# Patient Record
Sex: FEMALE | Race: WHITE | NOT HISPANIC OR LATINO | Employment: STUDENT | URBAN - METROPOLITAN AREA
[De-identification: names, ages, dates, MRNs, and addresses within clinical notes are randomized per-mention and may not be internally consistent; named-entity substitution may affect disease eponyms.]

---

## 2020-12-14 ENCOUNTER — OCCMED (OUTPATIENT)
Dept: URGENT CARE | Facility: CLINIC | Age: 20
End: 2020-12-14

## 2020-12-14 DIAGNOSIS — Z02.1 PRE-EMPLOYMENT HEALTH SCREENING EXAMINATION: Primary | ICD-10-CM

## 2020-12-14 PROCEDURE — 86480 TB TEST CELL IMMUN MEASURE: CPT

## 2020-12-16 LAB
GAMMA INTERFERON BACKGROUND BLD IA-ACNC: 0.02 IU/ML
M TB IFN-G BLD-IMP: NEGATIVE
M TB IFN-G CD4+ BCKGRND COR BLD-ACNC: 0 IU/ML
M TB IFN-G CD4+ BCKGRND COR BLD-ACNC: 0.01 IU/ML
MITOGEN IGNF BCKGRD COR BLD-ACNC: >10 IU/ML

## 2021-01-13 ENCOUNTER — IMMUNIZATIONS (OUTPATIENT)
Dept: FAMILY MEDICINE CLINIC | Facility: HOSPITAL | Age: 21
End: 2021-01-13

## 2021-01-13 DIAGNOSIS — Z23 ENCOUNTER FOR IMMUNIZATION: ICD-10-CM

## 2021-01-13 PROCEDURE — 91301 SARS-COV-2 / COVID-19 MRNA VACCINE (MODERNA) 100 MCG: CPT

## 2021-01-13 PROCEDURE — 0011A SARS-COV-2 / COVID-19 MRNA VACCINE (MODERNA) 100 MCG: CPT

## 2021-02-12 ENCOUNTER — IMMUNIZATIONS (OUTPATIENT)
Dept: FAMILY MEDICINE CLINIC | Facility: HOSPITAL | Age: 21
End: 2021-02-12

## 2021-02-12 DIAGNOSIS — Z23 ENCOUNTER FOR IMMUNIZATION: Primary | ICD-10-CM

## 2021-02-12 PROCEDURE — 0012A SARS-COV-2 / COVID-19 MRNA VACCINE (MODERNA) 100 MCG: CPT

## 2021-02-12 PROCEDURE — 91301 SARS-COV-2 / COVID-19 MRNA VACCINE (MODERNA) 100 MCG: CPT

## 2021-04-01 ENCOUNTER — OCCMED (OUTPATIENT)
Dept: URGENT CARE | Facility: CLINIC | Age: 21
End: 2021-04-01

## 2021-04-01 DIAGNOSIS — Z02.1 PRE-EMPLOYMENT HEALTH SCREENING EXAMINATION: Primary | ICD-10-CM

## 2021-04-01 PROCEDURE — 86480 TB TEST CELL IMMUN MEASURE: CPT | Performed by: PHYSICIAN ASSISTANT

## 2021-04-05 LAB
GAMMA INTERFERON BACKGROUND BLD IA-ACNC: 0.04 IU/ML
M TB IFN-G BLD-IMP: NEGATIVE
M TB IFN-G CD4+ BCKGRND COR BLD-ACNC: -0.01 IU/ML
M TB IFN-G CD4+ BCKGRND COR BLD-ACNC: 0 IU/ML
MITOGEN IGNF BCKGRD COR BLD-ACNC: >10 IU/ML

## 2023-08-21 ENCOUNTER — OFFICE VISIT (OUTPATIENT)
Dept: RHEUMATOLOGY | Facility: CLINIC | Age: 23
End: 2023-08-21
Payer: COMMERCIAL

## 2023-08-21 VITALS
BODY MASS INDEX: 29.07 KG/M2 | SYSTOLIC BLOOD PRESSURE: 98 MMHG | HEIGHT: 61 IN | HEART RATE: 87 BPM | DIASTOLIC BLOOD PRESSURE: 60 MMHG | WEIGHT: 154 LBS

## 2023-08-21 DIAGNOSIS — M35.9 CONNECTIVE TISSUE DISEASE (HCC): Primary | ICD-10-CM

## 2023-08-21 DIAGNOSIS — R76.8 POSITIVE ANA (ANTINUCLEAR ANTIBODY): ICD-10-CM

## 2023-08-21 DIAGNOSIS — I73.00 RAYNAUD'S DISEASE WITHOUT GANGRENE: ICD-10-CM

## 2023-08-21 DIAGNOSIS — R13.10 DYSPHAGIA, UNSPECIFIED TYPE: ICD-10-CM

## 2023-08-21 PROCEDURE — 99204 OFFICE O/P NEW MOD 45 MIN: CPT | Performed by: INTERNAL MEDICINE

## 2023-08-21 RX ORDER — OMEPRAZOLE 20 MG/1
CAPSULE, DELAYED RELEASE ORAL
COMMUNITY
Start: 2023-07-26

## 2023-08-21 RX ORDER — MONTELUKAST SODIUM 10 MG/1
10 TABLET ORAL DAILY
COMMUNITY
Start: 2023-08-09

## 2023-08-21 RX ORDER — FLUTICASONE PROPIONATE 110 UG/1
AEROSOL, METERED RESPIRATORY (INHALATION)
COMMUNITY

## 2023-08-21 RX ORDER — ONDANSETRON 4 MG/1
TABLET, ORALLY DISINTEGRATING ORAL
COMMUNITY
Start: 2023-06-14

## 2023-08-21 RX ORDER — DROSPIRENONE AND ETHINYL ESTRADIOL 0.02-3(28)
KIT ORAL
COMMUNITY

## 2023-08-21 RX ORDER — EPINEPHRINE 0.3 MG/.3ML
INJECTION SUBCUTANEOUS
COMMUNITY
Start: 2023-06-12

## 2023-08-21 RX ORDER — BACILLUS COAGULANS/INULIN 1B-250 MG
CAPSULE ORAL
COMMUNITY

## 2023-08-21 RX ORDER — DROSPIRENONE AND ETHINYL ESTRADIOL 0.02-3(28)
1 KIT ORAL DAILY
COMMUNITY

## 2023-08-21 RX ORDER — ALBUTEROL SULFATE 90 UG/1
AEROSOL, METERED RESPIRATORY (INHALATION)
COMMUNITY
Start: 2023-08-07

## 2023-08-21 RX ORDER — CETIRIZINE HYDROCHLORIDE 10 MG/1
CAPSULE, LIQUID FILLED ORAL
COMMUNITY

## 2023-08-21 NOTE — PROGRESS NOTES
Assessment and Plan:   Patient is a 66-year-old female who presents to rheumatology clinic for follow-up of abnormal lab work. Patient said that her primary care provider ordered immunologic lab work-up because she was experiencing Raynaud's type symptoms with discoloration of her fingertips in the cold. She had a positive ROBBIN as well as a positive RNP. Patient also reports difficulty swallowing from time to time with solids or liquids. She also states that her hands can get tight and difficult to make a fist. Patient reports no recent sicknesses or illnesses, and no immediate family history of autoimmune diseases. States that she gets joint aches in her neck and back as well as knees from time to time but could just be due to overuse. Will take Advil as needed. Symptoms may be consistent with scleroderma/MCTD vs eosinophilic granulomatosus with polyangiitis. Do labs  Schedule barium swallow    Unspecified connective tissue disease  - ordered ROBBIN, Anti-scleroderma, centromere antibody, C3 and C4 complement, Pr/Cr ratio urine, UA, CRP, Sed rate, ANCA  - schedule barium swallow  - contact clinic with any flares or concerns     Return to clinic in 6 months    Plan:  Diagnoses and all orders for this visit:    Connective tissue disease ([address])    Positive ROBBIN (antinuclear antibody)  -     Antinuclear Antibodies (ROBBIN), IFA  -     Sedimentation rate, automated  -     C-reactive protein  -     Urinalysis with microscopic  -     Protein / creatinine ratio, urine  -     C4 complement  -     C3 complement  -     Centromere Antibody  -     Anti-scleroderma antibody  -     FL barium swallow ROUTINE esophagus;  Future  -     Anti-neutrophilic cytoplasmic antibody    Raynaud's disease without gangrene  -     Antinuclear Antibodies (ROBBIN), IFA  -     Sedimentation rate, automated  -     C-reactive protein  -     Urinalysis with microscopic  -     Protein / creatinine ratio, urine  -     C4 complement  -     C3 complement  - Centromere Antibody  -     Anti-scleroderma antibody    Dysphagia, unspecified type  -     FL barium swallow ROUTINE esophagus; Future  -     Anti-neutrophilic cytoplasmic antibody    Other orders  -     Multiple Vitamin (MULTIVITAMIN ADULT PO)  -     OMEGA-3 FATTY ACIDS PO  -     albuterol (PROVENTIL HFA,VENTOLIN HFA) 90 mcg/act inhaler; INHALE 1 PUFF BY MOUTH EVERY 4 HOURS AS NEEDED  -     Bacillus Coagulans-Inulin (Probiotic) 1-250 BILLION-MG CAPS  -     Cetirizine HCl (ZyrTEC Allergy) 10 MG CAPS  -     drospirenone-ethinyl estradiol (KATHIE) 3-0.02 MG per tablet; Jennyfer (28) 3 mg-0.02 mg tablet   TAKE 1 TABLET BY MOUTH DAILY  -     EPINEPHrine (EPIPEN) 0.3 mg/0.3 mL SOAJ; inject 1 pen AS NEEDED AS DIRECTED  -     fluticasone (FLOVENT HFA) 110 MCG/ACT inhaler; Flovent  mcg/actuation aerosol inhaler   INHALE 1 PUFF BY MOUTH EVERY DAY (DURING ASTHMA FLAREUPS USE 1 PUFF TWICE DAILY)  -     montelukast (SINGULAIR) 10 mg tablet; Take 10 mg by mouth daily  -     omeprazole (PriLOSEC) 20 mg delayed release capsule; TAKE 1 CAPSULE BY MOUTH 30 MINUTES BEFORE MORNING MEAL  -     ondansetron (ZOFRAN-ODT) 4 mg disintegrating tablet; DISSOLVE 1 TABLET BY MOUTH EVERY 8 HOURS AS NEEDED FOR NAUSEA/VOMITING  -     drospirenone-ethinyl estradiol (Jennyfer) 3-0.02 MG per tablet; Take 1 tablet by mouth daily     Follow-up plan: return to clinic in 6 months        Chief Complaint  Chief Complaint   Patient presents with   • Establish Care     New patient here stating her PCP referred her here for upper knuckles on all her fingers intermittently x 1 year. Patient's PCP ordered blood work ( and has + family history of junenile R.A. and OA). Patient's ROBBIN and RNP AB's were positive (1.3). iqra Pena Seen is a 25 y.o.  female with PMH of asthma and GERD who presents as a Rheumatology consult referred by Self, Referral by PCP for evaluation of positive ROBBIN and RNP labs, as well as painful tips of fingers each morning.  It doesn't occur every morning, but will have flares for a week at time. Noticed her fingers change color with temperature changes, especially in the cold. Patient has chronic back and neck pain, maybe due to stress. Knees and ankles bother her from time, but could be due to overuse. Had ACL repaired on L knee (had to go and clean the knee out afterward with rash and puss- high eosinophils) which will swell from time to time but no hot or swollen joints. No recent sicknesses or sick contacts. Takes advil as needed for joint pain. Sometimes she feels like her hands get tight, and her feet get really hot, new over this past year. Family history of juvenile rheumatoid arthritis in a cousin. Review of Systems  Review of Systems   Constitutional: Positive for fatigue. HENT: Positive for sinus pressure, sinus pain and trouble swallowing. Eyes: Positive for itching. Respiratory: Negative. Cardiovascular: Negative. Gastrointestinal: Positive for constipation. Endocrine: Positive for cold intolerance. Genitourinary: Negative. Musculoskeletal: Positive for arthralgias (knees), back pain, myalgias, neck pain and neck stiffness. Skin: Positive for color change and rash. Allergic/Immunologic: Positive for food allergies. Neurological: Positive for numbness. Reviewed and agree.     Allergies  Allergies   Allergen Reactions   • Nuts - Food Allergy Wheezing     TREE NUTS   • Other Hives, Itching, Other (See Comments), Wheezing and Anaphylaxis     TREE NUTS   • Sesame Seed (Diagnostic) - Food Allergy Anaphylaxis, Itching and Wheezing   • Shellfish Allergy - Food Allergy Other (See Comments)       Home Medications    Current Outpatient Medications:   •  albuterol (PROVENTIL HFA,VENTOLIN HFA) 90 mcg/act inhaler, INHALE 1 PUFF BY MOUTH EVERY 4 HOURS AS NEEDED, Disp: , Rfl:   •  Bacillus Coagulans-Inulin (Probiotic) 1-250 BILLION-MG CAPS, , Disp: , Rfl:   •  Cetirizine HCl (ZyrTEC Allergy) 10 MG CAPS, , Disp: , Rfl:   •  drospirenone-ethinyl estradiol (Henrry Fraise) 3-0.02 MG per tablet, Take 1 tablet by mouth daily, Disp: , Rfl:   •  EPINEPHrine (EPIPEN) 0.3 mg/0.3 mL SOAJ, inject 1 pen AS NEEDED AS DIRECTED, Disp: , Rfl:   •  fluticasone (FLOVENT HFA) 110 MCG/ACT inhaler, Flovent  mcg/actuation aerosol inhaler  INHALE 1 PUFF BY MOUTH EVERY DAY (DURING ASTHMA FLAREUPS USE 1 PUFF TWICE DAILY), Disp: , Rfl:   •  montelukast (SINGULAIR) 10 mg tablet, Take 10 mg by mouth daily, Disp: , Rfl:   •  Multiple Vitamin (MULTIVITAMIN ADULT PO), , Disp: , Rfl:   •  OMEGA-3 FATTY ACIDS PO, , Disp: , Rfl:   •  omeprazole (PriLOSEC) 20 mg delayed release capsule, TAKE 1 CAPSULE BY MOUTH 30 MINUTES BEFORE MORNING MEAL, Disp: , Rfl:   •  ondansetron (ZOFRAN-ODT) 4 mg disintegrating tablet, DISSOLVE 1 TABLET BY MOUTH EVERY 8 HOURS AS NEEDED FOR NAUSEA/VOMITING, Disp: , Rfl:   •  drospirenone-ethinyl estradiol (KATHIE) 3-0.02 MG per tablet, Jennyfer (28) 3 mg-0.02 mg tablet  TAKE 1 TABLET BY MOUTH DAILY, Disp: , Rfl:     Past Medical History  History reviewed. No pertinent past medical history. Past Surgical History   History reviewed. No pertinent surgical history. Family History  History reviewed. No pertinent family history. No known family history of autoimmune or inflammatory diseases. 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 She is not ill-appearing. HENT:      Head: Normocephalic and atraumatic.       Right Ear: External ear normal.      Left Ear: External ear normal.      Nose: Nose normal.      Mouth/Throat:      Mouth: Mucous membranes are moist.      Pharynx: Oropharynx is clear. Posterior oropharyngeal erythema present. Eyes:      Extraocular Movements: Extraocular movements intact. Conjunctiva/sclera: Conjunctivae normal.   Cardiovascular:      Rate and Rhythm: Normal rate and regular rhythm. Heart sounds: No murmur heard. Pulmonary:      Effort: Pulmonary effort is normal.      Breath sounds: Normal breath sounds. Abdominal:      General: There is no distension. Palpations: Abdomen is soft. Tenderness: There is no abdominal tenderness. Musculoskeletal:         General: No swelling or tenderness. Normal range of motion. Cervical back: Normal range of motion and neck supple. Skin:     General: Skin is warm and dry. Comments: White discoloration on the dorsal fingers between the PIP and DIP joints     Neurological:      Mental Status: She is alert. Reviewed labs. Labs:   No visits with results within 6 Month(s) from this visit. Latest known visit with results is:   [facility]  on 04/01/2021   Component Date Value Ref Range Status   • QFT Nil 04/01/2021 0.04  0 - 8.0 IU/ml Final   • QFT TB1-NIL 04/01/2021 -0.01  IU/ml Final   • QFT TB2-NIL 04/01/2021 0.00  IU/ml Final   • QFT Mitogen-NIL 04/01/2021 >10.00  IU/ml Final   • QFT Final Interpretation 04/01/2021 Negative  Negative Final    No Interferon-gamma response to M. tuberculosis antigens detected. Infection with M. tuberculosis is unlikely. A single negative result does not exclude infection with M. tuberculosis. In patients at high risk for M. tuberculosis infection, a second test should be considered in accordance with the 2017 ATS/IDSA/CDC Clinical Practice Guidelines for Diagnosis of Tuberculosis in Adults and Children.  False negative results can be a result of incorrect blood sample collection or handling of the specimen affecting lymphocyte function.

## 2023-08-25 ENCOUNTER — TELEPHONE (OUTPATIENT)
Age: 23
End: 2023-08-25

## 2023-08-25 NOTE — TELEPHONE ENCOUNTER
I'm not sure, please ask her to call scheduling and ask how they do the one that was placed in the system

## 2023-08-25 NOTE — TELEPHONE ENCOUNTER
Caller: Self    Doctor: Quique Perry    Reason for call: Patient wants to clarify which barium study she is getting.  Is it the one where she swallows barium and get xrays or the one where the therapist is watching the flow of barium    Call back#: [phone]

## 2023-09-08 ENCOUNTER — PATIENT MESSAGE (OUTPATIENT)
Dept: RHEUMATOLOGY | Facility: CLINIC | Age: 23
End: 2023-09-08

## 2024-10-27 DIAGNOSIS — Z00.6 ENCOUNTER FOR EXAMINATION FOR NORMAL COMPARISON OR CONTROL IN CLINICAL RESEARCH PROGRAM: ICD-10-CM

## 2024-10-30 ENCOUNTER — APPOINTMENT (OUTPATIENT)
Dept: LAB | Facility: CLINIC | Age: 24
End: 2024-10-30

## 2024-10-30 DIAGNOSIS — Z00.6 ENCOUNTER FOR EXAMINATION FOR NORMAL COMPARISON OR CONTROL IN CLINICAL RESEARCH PROGRAM: ICD-10-CM

## 2024-10-30 PROCEDURE — 36415 COLL VENOUS BLD VENIPUNCTURE: CPT

## 2024-11-09 LAB
APOB+LDLR+PCSK9 GENE MUT ANL BLD/T: NOT DETECTED
BRCA1+BRCA2 DEL+DUP + FULL MUT ANL BLD/T: NOT DETECTED
MLH1+MSH2+MSH6+PMS2 GN DEL+DUP+FUL M: NOT DETECTED